# Patient Record
Sex: FEMALE | Race: BLACK OR AFRICAN AMERICAN | NOT HISPANIC OR LATINO | URBAN - METROPOLITAN AREA
[De-identification: names, ages, dates, MRNs, and addresses within clinical notes are randomized per-mention and may not be internally consistent; named-entity substitution may affect disease eponyms.]

---

## 2024-02-20 ENCOUNTER — HOSPITAL ENCOUNTER (OUTPATIENT)
Dept: RADIOLOGY | Facility: CLINIC | Age: 24
Discharge: HOME | End: 2024-02-20
Payer: COMMERCIAL

## 2024-02-20 DIAGNOSIS — Z36.87 ENCOUNTER FOR ANTENATAL SCREENING FOR UNCERTAIN DATES (HHS-HCC): ICD-10-CM

## 2024-02-20 PROCEDURE — 76815 OB US LIMITED FETUS(S): CPT

## 2024-02-20 PROCEDURE — 76815 OB US LIMITED FETUS(S): CPT | Performed by: OBSTETRICS & GYNECOLOGY

## 2024-02-25 ENCOUNTER — HOSPITAL ENCOUNTER (EMERGENCY)
Facility: HOSPITAL | Age: 24
Discharge: HOME | End: 2024-02-25
Attending: EMERGENCY MEDICINE
Payer: COMMERCIAL

## 2024-02-25 ENCOUNTER — APPOINTMENT (OUTPATIENT)
Dept: RADIOLOGY | Facility: HOSPITAL | Age: 24
End: 2024-02-25
Payer: COMMERCIAL

## 2024-02-25 VITALS
WEIGHT: 239 LBS | BODY MASS INDEX: 40.8 KG/M2 | TEMPERATURE: 96.8 F | OXYGEN SATURATION: 100 % | DIASTOLIC BLOOD PRESSURE: 78 MMHG | RESPIRATION RATE: 16 BRPM | SYSTOLIC BLOOD PRESSURE: 124 MMHG | HEIGHT: 64 IN | HEART RATE: 88 BPM

## 2024-02-25 DIAGNOSIS — N71.9 ENDOMETRITIS: ICD-10-CM

## 2024-02-25 DIAGNOSIS — R10.2 PELVIC PAIN: Primary | ICD-10-CM

## 2024-02-25 LAB
ANION GAP SERPL CALC-SCNC: 11 MMOL/L (ref 10–20)
APPEARANCE UR: ABNORMAL
B-HCG SERPL-ACNC: 521 MIU/ML
BASOPHILS # BLD AUTO: 0.04 X10*3/UL (ref 0–0.1)
BASOPHILS NFR BLD AUTO: 0.4 %
BILIRUB UR STRIP.AUTO-MCNC: NEGATIVE MG/DL
BUN SERPL-MCNC: 4 MG/DL (ref 6–23)
CALCIUM SERPL-MCNC: 8.8 MG/DL (ref 8.6–10.3)
CHLORIDE SERPL-SCNC: 104 MMOL/L (ref 98–107)
CO2 SERPL-SCNC: 25 MMOL/L (ref 21–32)
COLOR UR: YELLOW
CREAT SERPL-MCNC: 0.68 MG/DL (ref 0.5–1.05)
EGFRCR SERPLBLD CKD-EPI 2021: >90 ML/MIN/1.73M*2
EOSINOPHIL # BLD AUTO: 0.28 X10*3/UL (ref 0–0.7)
EOSINOPHIL NFR BLD AUTO: 2.7 %
ERYTHROCYTE [DISTWIDTH] IN BLOOD BY AUTOMATED COUNT: 13.9 % (ref 11.5–14.5)
GLUCOSE SERPL-MCNC: 92 MG/DL (ref 74–99)
GLUCOSE UR STRIP.AUTO-MCNC: NEGATIVE MG/DL
HCT VFR BLD AUTO: 35.9 % (ref 36–46)
HGB BLD-MCNC: 11.9 G/DL (ref 12–16)
IMM GRANULOCYTES # BLD AUTO: 0.02 X10*3/UL (ref 0–0.7)
IMM GRANULOCYTES NFR BLD AUTO: 0.2 % (ref 0–0.9)
KETONES UR STRIP.AUTO-MCNC: NEGATIVE MG/DL
LEUKOCYTE ESTERASE UR QL STRIP.AUTO: ABNORMAL
LYMPHOCYTES # BLD AUTO: 1.91 X10*3/UL (ref 1.2–4.8)
LYMPHOCYTES NFR BLD AUTO: 18.3 %
MCH RBC QN AUTO: 28.2 PG (ref 26–34)
MCHC RBC AUTO-ENTMCNC: 33.1 G/DL (ref 32–36)
MCV RBC AUTO: 85 FL (ref 80–100)
MONOCYTES # BLD AUTO: 0.75 X10*3/UL (ref 0.1–1)
MONOCYTES NFR BLD AUTO: 7.2 %
MUCOUS THREADS #/AREA URNS AUTO: ABNORMAL /LPF
NEUTROPHILS # BLD AUTO: 7.41 X10*3/UL (ref 1.2–7.7)
NEUTROPHILS NFR BLD AUTO: 71.2 %
NITRITE UR QL STRIP.AUTO: NEGATIVE
NRBC BLD-RTO: 0 /100 WBCS (ref 0–0)
PH UR STRIP.AUTO: 7 [PH]
PLATELET # BLD AUTO: 240 X10*3/UL (ref 150–450)
POTASSIUM SERPL-SCNC: 3.5 MMOL/L (ref 3.5–5.3)
PROT UR STRIP.AUTO-MCNC: ABNORMAL MG/DL
RBC # BLD AUTO: 4.22 X10*6/UL (ref 4–5.2)
RBC # UR STRIP.AUTO: ABNORMAL /UL
RBC #/AREA URNS AUTO: ABNORMAL /HPF
SODIUM SERPL-SCNC: 136 MMOL/L (ref 136–145)
SP GR UR STRIP.AUTO: 1.01
SQUAMOUS #/AREA URNS AUTO: ABNORMAL /HPF
UROBILINOGEN UR STRIP.AUTO-MCNC: <2 MG/DL
WBC # BLD AUTO: 10.4 X10*3/UL (ref 4.4–11.3)
WBC #/AREA URNS AUTO: ABNORMAL /HPF

## 2024-02-25 PROCEDURE — 87661 TRICHOMONAS VAGINALIS AMPLIF: CPT | Mod: 59,AHULAB | Performed by: PHYSICIAN ASSISTANT

## 2024-02-25 PROCEDURE — 85025 COMPLETE CBC W/AUTO DIFF WBC: CPT | Performed by: PHYSICIAN ASSISTANT

## 2024-02-25 PROCEDURE — 2500000004 HC RX 250 GENERAL PHARMACY W/ HCPCS (ALT 636 FOR OP/ED): Performed by: PHYSICIAN ASSISTANT

## 2024-02-25 PROCEDURE — 80048 BASIC METABOLIC PNL TOTAL CA: CPT | Performed by: PHYSICIAN ASSISTANT

## 2024-02-25 PROCEDURE — 87086 URINE CULTURE/COLONY COUNT: CPT | Mod: AHULAB | Performed by: PHYSICIAN ASSISTANT

## 2024-02-25 PROCEDURE — 96374 THER/PROPH/DIAG INJ IV PUSH: CPT

## 2024-02-25 PROCEDURE — 76815 OB US LIMITED FETUS(S): CPT

## 2024-02-25 PROCEDURE — 36415 COLL VENOUS BLD VENIPUNCTURE: CPT | Performed by: PHYSICIAN ASSISTANT

## 2024-02-25 PROCEDURE — 2500000001 HC RX 250 WO HCPCS SELF ADMINISTERED DRUGS (ALT 637 FOR MEDICARE OP): Performed by: PHYSICIAN ASSISTANT

## 2024-02-25 PROCEDURE — 76815 OB US LIMITED FETUS(S): CPT | Performed by: RADIOLOGY

## 2024-02-25 PROCEDURE — 81001 URINALYSIS AUTO W/SCOPE: CPT | Performed by: PHYSICIAN ASSISTANT

## 2024-02-25 PROCEDURE — 99284 EMERGENCY DEPT VISIT MOD MDM: CPT | Mod: 25

## 2024-02-25 PROCEDURE — 84702 CHORIONIC GONADOTROPIN TEST: CPT | Performed by: PHYSICIAN ASSISTANT

## 2024-02-25 PROCEDURE — 87800 DETECT AGNT MULT DNA DIREC: CPT | Mod: AHULAB | Performed by: PHYSICIAN ASSISTANT

## 2024-02-25 PROCEDURE — 96372 THER/PROPH/DIAG INJ SC/IM: CPT

## 2024-02-25 PROCEDURE — 74018 RADEX ABDOMEN 1 VIEW: CPT

## 2024-02-25 PROCEDURE — 87075 CULTR BACTERIA EXCEPT BLOOD: CPT | Mod: AHULAB | Performed by: PHYSICIAN ASSISTANT

## 2024-02-25 RX ORDER — AMOXICILLIN 500 MG/1
500 CAPSULE ORAL 3 TIMES DAILY
Qty: 21 CAPSULE | Refills: 0 | Status: SHIPPED | OUTPATIENT
Start: 2024-02-25 | End: 2024-03-03

## 2024-02-25 RX ORDER — CEFTRIAXONE 500 MG/1
500 INJECTION, POWDER, FOR SOLUTION INTRAMUSCULAR; INTRAVENOUS ONCE
Status: COMPLETED | OUTPATIENT
Start: 2024-02-25 | End: 2024-02-25

## 2024-02-25 RX ORDER — DOXYCYCLINE HYCLATE 100 MG
100 TABLET ORAL ONCE
Status: COMPLETED | OUTPATIENT
Start: 2024-02-25 | End: 2024-02-25

## 2024-02-25 RX ORDER — DOXYCYCLINE 100 MG/1
100 CAPSULE ORAL 2 TIMES DAILY
Qty: 20 CAPSULE | Refills: 0 | Status: SHIPPED | OUTPATIENT
Start: 2024-02-25 | End: 2024-03-06

## 2024-02-25 RX ORDER — NAPROXEN 500 MG/1
500 TABLET ORAL
Qty: 14 TABLET | Refills: 0 | Status: SHIPPED | OUTPATIENT
Start: 2024-02-25 | End: 2024-03-03

## 2024-02-25 RX ORDER — KETOROLAC TROMETHAMINE 30 MG/ML
15 INJECTION, SOLUTION INTRAMUSCULAR; INTRAVENOUS ONCE
Status: COMPLETED | OUTPATIENT
Start: 2024-02-25 | End: 2024-02-25

## 2024-02-25 RX ADMIN — CEFTRIAXONE SODIUM 500 MG: 500 INJECTION, POWDER, FOR SOLUTION INTRAMUSCULAR; INTRAVENOUS at 17:40

## 2024-02-25 RX ADMIN — KETOROLAC TROMETHAMINE 15 MG: 30 INJECTION, SOLUTION INTRAMUSCULAR at 14:04

## 2024-02-25 RX ADMIN — DOXYCYCLINE HYCLATE 100 MG: 100 TABLET, FILM COATED ORAL at 17:40

## 2024-02-25 ASSESSMENT — PAIN SCALES - GENERAL: PAINLEVEL_OUTOF10: 7

## 2024-02-25 ASSESSMENT — PAIN - FUNCTIONAL ASSESSMENT: PAIN_FUNCTIONAL_ASSESSMENT: 0-10

## 2024-02-25 ASSESSMENT — COLUMBIA-SUICIDE SEVERITY RATING SCALE - C-SSRS
6. HAVE YOU EVER DONE ANYTHING, STARTED TO DO ANYTHING, OR PREPARED TO DO ANYTHING TO END YOUR LIFE?: NO
1. IN THE PAST MONTH, HAVE YOU WISHED YOU WERE DEAD OR WISHED YOU COULD GO TO SLEEP AND NOT WAKE UP?: NO
2. HAVE YOU ACTUALLY HAD ANY THOUGHTS OF KILLING YOURSELF?: NO

## 2024-02-25 NOTE — ED TRIAGE NOTES
Pt c/o abd pain x6 days. She states that she was 16 weeks pregnant and had an  five days ago and has not been bleeding at all and is concerned.

## 2024-02-25 NOTE — ED PROVIDER NOTES
HPI   Chief Complaint   Patient presents with    Abdominal Pain       23-year-old female had a surgical  5 days ago at 16 weeks gestation.  Had bleeding the day of the procedure which stopped.  Now is having pelvic pain.  No vaginal discharge or bleeding.  No urinary symptoms.  No fever.  No vomiting.  No prescription medicines.  No known allergies.      History provided by:  Patient   used: No                        Alexander Coma Scale Score: 15                     Patient History   Past Medical History:   Diagnosis Date    Personal history of other diseases of the respiratory system     History of asthma     Past Surgical History:   Procedure Laterality Date    BI US GUIDED BREAST LOCALIZATION AND BIOPSY RIGHT Right 2022    BI US GUIDED BREAST LOCALIZATION AND BIOPSY RIGHT 2022    OTHER SURGICAL HISTORY  2019    No history of surgery     No family history on file.  Social History     Tobacco Use    Smoking status: Not on file    Smokeless tobacco: Not on file   Substance Use Topics    Alcohol use: Not on file    Drug use: Not on file       Physical Exam   ED Triage Vitals [24 1230]   Temperature Heart Rate Respirations BP   36 °C (96.8 °F) 78 18 138/81      Pulse Ox Temp Source Heart Rate Source Patient Position   100 % Oral -- --      BP Location FiO2 (%)     -- --       Physical Exam  Vitals and nursing note reviewed. Exam conducted with a chaperone present.   Constitutional:       General: She is not in acute distress.     Appearance: She is well-developed. She is obese. She is not ill-appearing, toxic-appearing or diaphoretic.   HENT:      Head: Normocephalic and atraumatic.   Cardiovascular:      Rate and Rhythm: Normal rate.   Pulmonary:      Effort: Pulmonary effort is normal.   Abdominal:      Palpations: Abdomen is soft.      Tenderness: There is abdominal tenderness in the suprapubic area.   Genitourinary:     Vagina: Normal.      Cervix: Discharge  present.      Uterus: Enlarged and tender.       Adnexa: Right adnexa normal and left adnexa normal.   Skin:     General: Skin is warm and dry.   Neurological:      Mental Status: She is alert.         ED Course & MDM   ED Course as of 02/25/24 1739   Sun Feb 25, 2024   1342 CBC normal white count 10.4.  Mild anemia.  BMP unremarkable. [GA]   1353 Quantitative  [GA]   1537 Urinalysis 6-10 red cells 1-5 white cells.  No nitrites. [GA]   1538 Ultrasound radiology report reviewed.  Fibroid.  See report. [GA]   1628 KUB no free air. [GA]      ED Course User Index  [GA] Robby Ulloa PA-C         Diagnoses as of 02/25/24 1739   Pelvic pain   Endometritis     Labs Reviewed   CBC WITH AUTO DIFFERENTIAL - Abnormal       Result Value    WBC 10.4      nRBC 0.0      RBC 4.22      Hemoglobin 11.9 (*)     Hematocrit 35.9 (*)     MCV 85      MCH 28.2      MCHC 33.1      RDW 13.9      Platelets 240      Neutrophils % 71.2      Immature Granulocytes %, Automated 0.2      Lymphocytes % 18.3      Monocytes % 7.2      Eosinophils % 2.7      Basophils % 0.4      Neutrophils Absolute 7.41      Immature Granulocytes Absolute, Automated 0.02      Lymphocytes Absolute 1.91      Monocytes Absolute 0.75      Eosinophils Absolute 0.28      Basophils Absolute 0.04     BASIC METABOLIC PANEL - Abnormal    Glucose 92      Sodium 136      Potassium 3.5      Chloride 104      Bicarbonate 25      Anion Gap 11      Urea Nitrogen 4 (*)     Creatinine 0.68      eGFR >90      Calcium 8.8     HUMAN CHORIONIC GONADOTROPIN, SERUM QUANTITATIVE - Abnormal    HCG, Beta-Quantitative 521 (*)     Narrative:      Total HCG measurement is performed using the [employer] Access   Immunoassay which detects intact HCG and free beta HCG subunit.    This test is not indicated for use as a tumor marker.   HCG testing is performed using a different test methodology at [facility] ([facility] than other [facility]. Direct result comparison    should only be made within the same method.       URINALYSIS WITH REFLEX CULTURE AND MICROSCOPIC - Abnormal    Color, Urine Yellow      Appearance, Urine Cloudy (*)     Specific Gravity, Urine 1.006      pH, Urine 7.0      Protein, Urine 30 (1+) (*)     Glucose, Urine NEGATIVE      Blood, Urine LARGE (3+) (*)     Ketones, Urine NEGATIVE      Bilirubin, Urine NEGATIVE      Urobilinogen, Urine <2.0      Nitrite, Urine NEGATIVE      Leukocyte Esterase, Urine SMALL (1+) (*)    MICROSCOPIC ONLY, URINE - Abnormal    WBC, Urine 1-5      RBC, Urine 6-10 (*)     Squamous Epithelial Cells, Urine >100 (4+)      Mucus, Urine 1+     URINE CULTURE   TISSUE/WOUND CULTURE/SMEAR   URINALYSIS WITH REFLEX CULTURE AND MICROSCOPIC    Narrative:     The following orders were created for panel order Urinalysis with Reflex Culture and Microscopic.  Procedure                               Abnormality         Status                     ---------                               -----------         ------                     Urinalysis with Reflex Cu...[[id number]]  Abnormal            Final result               Extra Urine Gray Tube[[id number]]                                                          Please view results for these tests on the individual orders.   EXTRA URINE GRAY TUBE   C. TRACHOMATIS + N. GONORRHOEAE, AMPLIFIED   TRICH VAGINALIS, AMPLIFIED      XR abdomen 1 view   Final Result   Nonobstructive bowel gas pattern.             MACRO:   None.        Signed by: Janusz Wolf 2/25/2024 4:12 PM   Dictation workstation:   [id number]      US OB limited 1+ fetuses   Final Result   5.9 x 5.1 x 5.8 cm large heterogenous subserosal fibroid in the   posterior myometrium. The endometrium is displaced anteriorly and   poorly characterize on this examination. No obvious collection is   identified in the endometrial cavity though in this clinical setting,   continued surveillance is recommended with follow-up ultrasound and   correlation with  quantitative beta HCG levels.        2.4 x 1.8 x 2.7 cm hypoechoic fibroid in the anterior myometrium.        No ovarian mass or torsion.        Signed by: Nitesh Flores 2/25/2024 3:10 PM   Dictation workstation:   [id number]         Medical Decision Making  Postprocedural pelvic pain.  Reported 16 weeks gestation aborted 5 days ago.  Rule out retained POC.  Procedural complications.  Postop infection.  Pelvic exam with Kaia ruvalcaba as female chaperone.  Ultrasound report uterine fibroid.  Labs reviewed.  GYN consult: Dr. Reyes at 1630.  Antibiotics for endometritis outpatient follow-up.  Recommends amoxicillin    The patient has also been seen and evaluated by the ER physician.  Recommends ceftriaxone followed by doxycycline and amoxicillin.  Agrees with outpatient management follow-up.  Agrees with ER assessment, disposition and plan.    Evaluation concerning possible early endometritis.  Antibiotics as noted above.  Patient is nontoxic no distress hemodynamic stable.  She has a gynecologist at [facility] which she will follow-up with.  She is also given referrals for Dr. Reyes from .  Advised return to ED if severe pain fever vomiting or condition worsening.  Stable for discharge outpatient management.    Amount and/or Complexity of Data Reviewed  Labs: ordered. Decision-making details documented in ED Course.     Details: As above  Radiology: ordered. Decision-making details documented in ED Course.     Details: As above  Discussion of management or test interpretation with external provider(s): GYN consultation: Dr. Reyes at 1630.  Advises coverage for possible endometritis with amoxicillin.  Outpatient follow-up 2 to 3 days.    Risk  OTC drugs.  Prescription drug management.        Procedure  Procedures     Robby Ulloa PA-C  02/26/24 0949

## 2024-02-26 LAB
C TRACH RRNA SPEC QL NAA+PROBE: NEGATIVE
N GONORRHOEA DNA SPEC QL PROBE+SIG AMP: NEGATIVE
T VAGINALIS RRNA SPEC QL NAA+PROBE: NEGATIVE

## 2024-02-27 LAB — BACTERIA UR CULT: NORMAL

## 2024-02-28 LAB
BACTERIA SPEC CULT: ABNORMAL
GRAM STN SPEC: ABNORMAL
GRAM STN SPEC: ABNORMAL

## 2024-08-07 ENCOUNTER — HOSPITAL ENCOUNTER (EMERGENCY)
Facility: HOSPITAL | Age: 24
Discharge: HOME | End: 2024-08-07
Payer: COMMERCIAL

## 2024-08-07 VITALS
OXYGEN SATURATION: 100 % | TEMPERATURE: 97.7 F | HEIGHT: 64 IN | HEART RATE: 88 BPM | DIASTOLIC BLOOD PRESSURE: 85 MMHG | WEIGHT: 210 LBS | RESPIRATION RATE: 16 BRPM | SYSTOLIC BLOOD PRESSURE: 145 MMHG | BODY MASS INDEX: 35.85 KG/M2

## 2024-08-07 DIAGNOSIS — K08.89 PAIN, DENTAL: Primary | ICD-10-CM

## 2024-08-07 LAB — PREGNANCY TEST URINE, POC: NEGATIVE

## 2024-08-07 PROCEDURE — 99284 EMERGENCY DEPT VISIT MOD MDM: CPT

## 2024-08-07 PROCEDURE — 81025 URINE PREGNANCY TEST: CPT

## 2024-08-07 PROCEDURE — 96372 THER/PROPH/DIAG INJ SC/IM: CPT

## 2024-08-07 PROCEDURE — 99283 EMERGENCY DEPT VISIT LOW MDM: CPT

## 2024-08-07 PROCEDURE — 2500000004 HC RX 250 GENERAL PHARMACY W/ HCPCS (ALT 636 FOR OP/ED)

## 2024-08-07 RX ORDER — ACETAMINOPHEN 325 MG/1
975 TABLET ORAL ONCE
Status: COMPLETED | OUTPATIENT
Start: 2024-08-07 | End: 2024-08-07

## 2024-08-07 RX ORDER — KETOROLAC TROMETHAMINE 30 MG/ML
30 INJECTION, SOLUTION INTRAMUSCULAR; INTRAVENOUS ONCE
Status: COMPLETED | OUTPATIENT
Start: 2024-08-07 | End: 2024-08-07

## 2024-08-07 RX ORDER — IBUPROFEN 600 MG/1
600 TABLET ORAL EVERY 6 HOURS PRN
Qty: 28 TABLET | Refills: 0 | Status: SHIPPED | OUTPATIENT
Start: 2024-08-07 | End: 2024-08-14

## 2024-08-07 RX ORDER — ACETAMINOPHEN 500 MG
500 TABLET ORAL EVERY 6 HOURS PRN
Qty: 28 TABLET | Refills: 0 | Status: SHIPPED | OUTPATIENT
Start: 2024-08-07 | End: 2024-08-14

## 2024-08-07 ASSESSMENT — LIFESTYLE VARIABLES
EVER FELT BAD OR GUILTY ABOUT YOUR DRINKING: NO
HAVE YOU EVER FELT YOU SHOULD CUT DOWN ON YOUR DRINKING: NO
TOTAL SCORE: 0
HAVE PEOPLE ANNOYED YOU BY CRITICIZING YOUR DRINKING: NO
EVER HAD A DRINK FIRST THING IN THE MORNING TO STEADY YOUR NERVES TO GET RID OF A HANGOVER: NO

## 2024-08-07 ASSESSMENT — PAIN SCALES - GENERAL: PAINLEVEL_OUTOF10: 7

## 2024-08-07 ASSESSMENT — PAIN DESCRIPTION - LOCATION: LOCATION: TEETH

## 2024-08-07 ASSESSMENT — PAIN - FUNCTIONAL ASSESSMENT: PAIN_FUNCTIONAL_ASSESSMENT: 0-10

## 2024-08-07 ASSESSMENT — PAIN DESCRIPTION - PAIN TYPE: TYPE: ACUTE PAIN

## 2024-08-07 ASSESSMENT — COLUMBIA-SUICIDE SEVERITY RATING SCALE - C-SSRS
1. IN THE PAST MONTH, HAVE YOU WISHED YOU WERE DEAD OR WISHED YOU COULD GO TO SLEEP AND NOT WAKE UP?: NO
2. HAVE YOU ACTUALLY HAD ANY THOUGHTS OF KILLING YOURSELF?: NO
6. HAVE YOU EVER DONE ANYTHING, STARTED TO DO ANYTHING, OR PREPARED TO DO ANYTHING TO END YOUR LIFE?: NO

## 2024-08-07 NOTE — ED PROVIDER NOTES
HPI   Chief Complaint   Patient presents with   • Dental Pain   This is a 24-year-old female with a past medical history significant for asthma who presents to the ED with dental pain.  Patient states that she has been experiencing throbbing pains in her left lower molar for the past month, rated 7/10, that radiates into down into her neck and up into her ear.  She states that she believes her wisdom teeth are coming in on the bottom.  She has been taking Tylenol and Orajel at home, without much relief.  Denies any throat swelling or difficulty swallowing.  She has been able to eat and drink as normal.     Denies any fevers, chills, chest pain, shortness of breath, abdominal pain, N/V/D    Limitations to history: None  Independent Historians: Patient  External Records Reviewed: None    Patient History   Past Medical History:   Diagnosis Date   • Personal history of other diseases of the respiratory system     History of asthma     Past Surgical History:   Procedure Laterality Date   • BI US GUIDED BREAST LOCALIZATION AND BIOPSY RIGHT Right 1/26/2022    BI US GUIDED BREAST LOCALIZATION AND BIOPSY RIGHT 1/26/2022   • OTHER SURGICAL HISTORY  08/20/2019    No history of surgery     No family history on file.  Social History     Tobacco Use   • Smoking status: Not on file   • Smokeless tobacco: Not on file   Substance Use Topics   • Alcohol use: Not on file   • Drug use: Not on file       Physical Exam   ED Triage Vitals [08/07/24 1225]   Temperature Heart Rate Respirations BP   36.5 °C (97.7 °F) 88 16 145/85      Pulse Ox Temp Source Heart Rate Source Patient Position   100 % Temporal Monitor --      BP Location FiO2 (%)     -- --       Physical Exam  Constitutional:       General: She is not in acute distress.     Appearance: She is not ill-appearing.   HENT:      Mouth/Throat:      Mouth: Mucous membranes are moist.      Pharynx: Oropharynx is clear. No oropharyngeal exudate or posterior oropharyngeal erythema.       Comments: Dental susan to tooth #16.  No broken teeth. No erythema, edema or fluctuance to the gumline. No intraoral lesions.  Tongue is normal.  No tonsillitis.  Uvula is midline and nonedematous.  No tenderness to the floor the mouth  Cardiovascular:      Rate and Rhythm: Normal rate and regular rhythm.      Heart sounds: Normal heart sounds.   Pulmonary:      Effort: Pulmonary effort is normal. No respiratory distress.      Breath sounds: Normal breath sounds.   Abdominal:      General: Bowel sounds are normal.      Palpations: Abdomen is soft.      Tenderness: There is no abdominal tenderness. There is no guarding or rebound.   Musculoskeletal:      Cervical back: Normal range of motion and neck supple. No rigidity.   Lymphadenopathy:      Cervical: No cervical adenopathy.   Neurological:      General: No focal deficit present.      Mental Status: She is alert and oriented to person, place, and time.         ED Course & MDM   ED Course as of 08/07/24 1310   Wed Aug 07, 2024   1306 POCT pregnancy, urine  Negative [LH]      ED Course User Index  [LH] Kaela Gallegos PA-C         Diagnoses as of 08/07/24 1310   Pain, dental       Medical Decision Making  This is a 24-year-old female with a past medical history significant for asthma who presents to the ED with dental pain.  Patient states that she has been experiencing throbbing pains in her left lower molar for the past month, rated 7/10, that radiates into down into her neck and up into her ear.     On physical exam, patient is managing her own secretions and speaking in a clear, nonmuffled voice. Mucus membranes are moist. There is a dental carry to tooth #16. No broken teeth. Oropharynx is clear without posterior oropharyngeal erythema or exudates. No erythema, edema or fluctuance at the gumline. No intraoral lesions.  Tongue is normal. No tonsillitis. Uvula is midline and nonedematous. No tenderness to the floor the mouth.      Administered Tylenol and Toradol  for pain. Based on benign physical exam findings, there is no evidence for acute infection, antibiotics not indicated at this time.  Provided prescriptions for analgesics.  Counseled patient to follow-up with a dentist, provided the contact information for the [facility]. Discussed ED return criteria.  Patient verbalized understanding and was agreeable to plan of care.  Discharged in stable condition.         Kaela Gallegos PA-C  08/07/24 2723

## 2024-08-07 NOTE — ED TRIAGE NOTES
Pt presents to ED with dental pain. Pt c/o pain on L side (states it sometimes goes back and forth) that radiates to her neck. Pt denies trauma or injury. Pt states she thinks her wisdom teeth are growing in. Pt states pain started x 1 month ago. Pt states she was taking tylenol and Orajel at home but it is not working for her anymore.